# Patient Record
Sex: MALE | Race: WHITE | NOT HISPANIC OR LATINO | Employment: FULL TIME | URBAN - METROPOLITAN AREA
[De-identification: names, ages, dates, MRNs, and addresses within clinical notes are randomized per-mention and may not be internally consistent; named-entity substitution may affect disease eponyms.]

---

## 2023-04-21 DIAGNOSIS — Z00.6 EXAMINATION FOR NORMAL COMPARISON OR CONTROL IN CLINICAL RESEARCH: Primary | ICD-10-CM

## 2023-04-24 PROBLEM — R59.0 HILAR ADENOPATHY: Status: ACTIVE | Noted: 2023-04-24

## 2023-04-24 NOTE — PROGRESS NOTES
Rajiv Jurado is a 57 y.o. male here for evaluation of   Chief Complaint   Patient presents with   • Lung Nodule     NP       Problem list:  1. Hilar and mediastinal adenopathy  2. Asthma  3. GERD  4. Osteoarthritis  5. Migraine headaches  6. Status post cholecystectomy  7. Smokeless tobacco. Snuff  8. Allergy Asprin throat and face swell, soa    History of Present Illness    57-year-old gentleman, with hypertension, asthma, GERD, osteoarthritis presenting to his primary care physician on April 17, 2023 with shortness of air on exertion and fatigue.  Resting room air saturation was 97%.  He has a history of chewing tobacco.  He underwent a CT scan of the chest that reveals extensive mediastinal and hilar adenopathy. Denies fever, night sweats, but has weight loss.  He is having a lot of fatigue and intermittent shortness of air.  He was diagnosed with asthma as a child.  He does not wheeze frequently but does occasionally have wheezing.  He has aspirin sensitivity with swelling of his throat, face and eyes and severe shortness of air.  He gets a similar reaction to ibuprofen.  He does not have a history of nasal polyps.  He mostly outgrew his asthma and does not carry an inhaler.      Review of Systems   Constitutional: Positive for activity change, appetite change and fatigue.   HENT: Positive for congestion and postnasal drip.    Eyes: Positive for itching and visual disturbance.   Respiratory: Positive for cough, choking, chest tightness and shortness of breath.    Gastrointestinal: Positive for abdominal pain, constipation and nausea.   Endocrine: Positive for polyphagia.   Musculoskeletal: Positive for arthralgias, back pain, joint swelling and myalgias.   Neurological: Positive for weakness and light-headedness.   Psychiatric/Behavioral: Positive for confusion and sleep disturbance. The patient is nervous/anxious.          Current Outpatient Medications:   •  lisinopril (PRINIVIL,ZESTRIL) 40 MG tablet, Take  "1 tablet by mouth Daily., Disp: , Rfl:     Past Medical History:   Diagnosis Date   • Arthritis    • Asthma    • Asthma, extrinsic    • Bronchitis    • Diverticulosis    • GERD (gastroesophageal reflux disease)    • Headache    • Hypertension    • Osteoarthritis      Past Surgical History:   Procedure Laterality Date   • CHOLECYSTECTOMY       Social History     Socioeconomic History   • Marital status:    • Number of children: 3   Tobacco Use   • Smoking status: Never   • Smokeless tobacco: Current     Types: Snuff   Vaping Use   • Vaping Use: Never used   Substance and Sexual Activity   • Alcohol use: Never   • Drug use: Never     Family History   Problem Relation Age of Onset   • Cancer Mother 55   • Lung cancer Mother    • Heart disease Father 72   • Lung disease Father    • Cancer Sister 46   • Diabetes type II Sister      Blood pressure 122/84, pulse 57, temperature 97.1 °F (36.2 °C), temperature source Temporal, height 182.9 cm (72\"), weight 94 kg (207 lb 3.2 oz), SpO2 95 %.    Physical Exam  Constitutional:       General: He is not in acute distress.     Appearance: He is normal weight.   HENT:      Head: Normocephalic and atraumatic.      Nose: No congestion.      Mouth/Throat:      Mouth: Mucous membranes are moist.      Pharynx: Oropharynx is clear.      Comments: PND  Eyes:      Conjunctiva/sclera: Conjunctivae normal.   Cardiovascular:      Rate and Rhythm: Normal rate and regular rhythm.      Heart sounds: No murmur heard.  Pulmonary:      Effort: Pulmonary effort is normal.      Breath sounds: No wheezing or rhonchi.   Abdominal:      General: Bowel sounds are normal.      Palpations: Abdomen is soft.   Musculoskeletal:         General: No deformity.      Right lower leg: No edema.      Left lower leg: No edema.   Lymphadenopathy:      Cervical: No cervical adenopathy.   Skin:     General: Skin is warm and dry.   Neurological:      Mental Status: He is alert and oriented to person, place, and " time.   Psychiatric:         Mood and Affect: Mood normal.           PFTs:    Radiology:    CT scan of the chest April 17, 2023, extensive paratracheal, AP window, subcarinal and bilateral hilar adenopathy    Lab:    Diagnoses and all orders for this visit:    1. Hilar adenopathy (Primary)        Discussion:     57-year-old gentleman with hilar and subcarinal adenopathy, fatigue and shortness of air.  He does not have any palpable cervical adenopathy.  He does not smoke cigarettes but he does dip.  This could represent sarcoidosis, lymphoma, less likely metastatic cancer.  He has no previous history of a neoplasm.  He was having left lower quadrant pain and is scheduled for colonoscopy on May 10.    Schedule EBUS bronchoscopy with Dr. Woody Cohen  CBC with differential, BMP, pro time, fungal serologies  Follow-up in 2 weeks    Kat Rosado MD

## 2023-04-25 ENCOUNTER — PATIENT OUTREACH (OUTPATIENT)
Dept: ONCOLOGY | Facility: CLINIC | Age: 58
End: 2023-04-25
Payer: COMMERCIAL

## 2023-04-25 ENCOUNTER — OFFICE VISIT (OUTPATIENT)
Dept: PULMONOLOGY | Facility: CLINIC | Age: 58
End: 2023-04-25
Payer: COMMERCIAL

## 2023-04-25 VITALS
SYSTOLIC BLOOD PRESSURE: 122 MMHG | BODY MASS INDEX: 28.06 KG/M2 | OXYGEN SATURATION: 95 % | HEART RATE: 57 BPM | TEMPERATURE: 97.1 F | DIASTOLIC BLOOD PRESSURE: 84 MMHG | HEIGHT: 72 IN | WEIGHT: 207.2 LBS

## 2023-04-25 DIAGNOSIS — R59.9 ADENOPATHY: Primary | ICD-10-CM

## 2023-04-25 DIAGNOSIS — R06.02 SOB (SHORTNESS OF BREATH): ICD-10-CM

## 2023-04-25 DIAGNOSIS — R59.0 HILAR ADENOPATHY: Primary | ICD-10-CM

## 2023-04-25 LAB
BASOPHILS # BLD AUTO: 0.08 10*3/MM3 (ref 0–0.2)
BASOPHILS NFR BLD AUTO: 1.4 % (ref 0–1.5)
DEPRECATED RDW RBC AUTO: 42.6 FL (ref 37–54)
EOSINOPHIL # BLD AUTO: 0.26 10*3/MM3 (ref 0–0.4)
EOSINOPHIL NFR BLD AUTO: 4.5 % (ref 0.3–6.2)
ERYTHROCYTE [DISTWIDTH] IN BLOOD BY AUTOMATED COUNT: 13.5 % (ref 12.3–15.4)
HCT VFR BLD AUTO: 44.3 % (ref 37.5–51)
HGB BLD-MCNC: 15.4 G/DL (ref 13–17.7)
IMM GRANULOCYTES # BLD AUTO: 0.02 10*3/MM3 (ref 0–0.05)
IMM GRANULOCYTES NFR BLD AUTO: 0.3 % (ref 0–0.5)
INR PPP: 1.05 (ref 0.84–1.13)
LYMPHOCYTES # BLD AUTO: 0.97 10*3/MM3 (ref 0.7–3.1)
LYMPHOCYTES NFR BLD AUTO: 16.8 % (ref 19.6–45.3)
MCH RBC QN AUTO: 30.2 PG (ref 26.6–33)
MCHC RBC AUTO-ENTMCNC: 34.8 G/DL (ref 31.5–35.7)
MCV RBC AUTO: 86.9 FL (ref 79–97)
MONOCYTES # BLD AUTO: 0.57 10*3/MM3 (ref 0.1–0.9)
MONOCYTES NFR BLD AUTO: 9.9 % (ref 5–12)
NEUTROPHILS NFR BLD AUTO: 3.88 10*3/MM3 (ref 1.7–7)
NEUTROPHILS NFR BLD AUTO: 67.1 % (ref 42.7–76)
NRBC BLD AUTO-RTO: 0 /100 WBC (ref 0–0.2)
PLATELET # BLD AUTO: 179 10*3/MM3 (ref 140–450)
PMV BLD AUTO: 11.5 FL (ref 6–12)
PROTHROMBIN TIME: 13.6 SECONDS (ref 11.4–14.4)
RBC # BLD AUTO: 5.1 10*6/MM3 (ref 4.14–5.8)
WBC NRBC COR # BLD: 5.78 10*3/MM3 (ref 3.4–10.8)

## 2023-04-25 PROCEDURE — 86606 ASPERGILLUS ANTIBODY: CPT | Performed by: INTERNAL MEDICINE

## 2023-04-25 PROCEDURE — 86612 BLASTOMYCES ANTIBODY: CPT | Performed by: INTERNAL MEDICINE

## 2023-04-25 PROCEDURE — 85025 COMPLETE CBC W/AUTO DIFF WBC: CPT | Performed by: INTERNAL MEDICINE

## 2023-04-25 PROCEDURE — 85610 PROTHROMBIN TIME: CPT | Performed by: INTERNAL MEDICINE

## 2023-04-25 PROCEDURE — 80048 BASIC METABOLIC PNL TOTAL CA: CPT | Performed by: INTERNAL MEDICINE

## 2023-04-25 RX ORDER — LISINOPRIL 40 MG/1
40 TABLET ORAL DAILY
COMMUNITY

## 2023-04-26 ENCOUNTER — ANESTHESIA EVENT (OUTPATIENT)
Dept: GASTROENTEROLOGY | Facility: HOSPITAL | Age: 58
End: 2023-04-26
Payer: COMMERCIAL

## 2023-04-26 ENCOUNTER — PREP FOR SURGERY (OUTPATIENT)
Dept: OTHER | Facility: HOSPITAL | Age: 58
End: 2023-04-26
Payer: COMMERCIAL

## 2023-04-26 DIAGNOSIS — R91.1 PULMONARY NODULE: Primary | ICD-10-CM

## 2023-04-26 LAB
ANION GAP SERPL CALCULATED.3IONS-SCNC: 10.4 MMOL/L (ref 5–15)
BUN SERPL-MCNC: 14 MG/DL (ref 6–20)
BUN/CREAT SERPL: 10.1 (ref 7–25)
CALCIUM SPEC-SCNC: 10.1 MG/DL (ref 8.6–10.5)
CHLORIDE SERPL-SCNC: 105 MMOL/L (ref 98–107)
CO2 SERPL-SCNC: 24.6 MMOL/L (ref 22–29)
CREAT SERPL-MCNC: 1.39 MG/DL (ref 0.76–1.27)
EGFRCR SERPLBLD CKD-EPI 2021: 59.5 ML/MIN/1.73
GLUCOSE SERPL-MCNC: 75 MG/DL (ref 65–99)
POTASSIUM SERPL-SCNC: 4.6 MMOL/L (ref 3.5–5.2)
SODIUM SERPL-SCNC: 140 MMOL/L (ref 136–145)

## 2023-04-26 RX ORDER — LIDOCAINE HYDROCHLORIDE 40 MG/ML
4 INJECTION, SOLUTION RETROBULBAR; TOPICAL ONCE
Status: CANCELLED | OUTPATIENT
Start: 2023-04-26 | End: 2023-04-26

## 2023-04-26 RX ORDER — SODIUM CHLORIDE 0.9 % (FLUSH) 0.9 %
10 SYRINGE (ML) INJECTION EVERY 12 HOURS SCHEDULED
Status: CANCELLED | OUTPATIENT
Start: 2023-04-26

## 2023-04-26 RX ORDER — LIDOCAINE HYDROCHLORIDE 10 MG/ML
0.5 INJECTION, SOLUTION EPIDURAL; INFILTRATION; INTRACAUDAL; PERINEURAL ONCE AS NEEDED
Status: CANCELLED | OUTPATIENT
Start: 2023-04-26

## 2023-04-26 RX ORDER — HYDROMORPHONE HYDROCHLORIDE 1 MG/ML
0.5 INJECTION, SOLUTION INTRAMUSCULAR; INTRAVENOUS; SUBCUTANEOUS
Status: CANCELLED | OUTPATIENT
Start: 2023-04-26

## 2023-04-26 RX ORDER — SODIUM CHLORIDE 9 MG/ML
125 INJECTION, SOLUTION INTRAVENOUS CONTINUOUS
Status: CANCELLED | OUTPATIENT
Start: 2023-04-26

## 2023-04-26 RX ORDER — SODIUM CHLORIDE 9 MG/ML
40 INJECTION, SOLUTION INTRAVENOUS AS NEEDED
Status: CANCELLED | OUTPATIENT
Start: 2023-04-26

## 2023-04-26 RX ORDER — SODIUM CHLORIDE 0.9 % (FLUSH) 0.9 %
10 SYRINGE (ML) INJECTION AS NEEDED
Status: CANCELLED | OUTPATIENT
Start: 2023-04-26

## 2023-04-26 RX ORDER — SODIUM CHLORIDE, SODIUM LACTATE, POTASSIUM CHLORIDE, CALCIUM CHLORIDE 600; 310; 30; 20 MG/100ML; MG/100ML; MG/100ML; MG/100ML
9 INJECTION, SOLUTION INTRAVENOUS CONTINUOUS
Status: CANCELLED | OUTPATIENT
Start: 2023-04-26

## 2023-04-26 RX ORDER — SODIUM CHLORIDE 0.9 % (FLUSH) 0.9 %
3 SYRINGE (ML) INJECTION EVERY 12 HOURS SCHEDULED
Status: CANCELLED | OUTPATIENT
Start: 2023-04-26

## 2023-04-26 RX ORDER — FAMOTIDINE 20 MG/1
20 TABLET, FILM COATED ORAL ONCE
Status: CANCELLED | OUTPATIENT
Start: 2023-04-26 | End: 2023-04-26

## 2023-04-26 RX ORDER — FENTANYL CITRATE 50 UG/ML
50 INJECTION, SOLUTION INTRAMUSCULAR; INTRAVENOUS
Status: CANCELLED | OUTPATIENT
Start: 2023-04-26

## 2023-04-27 ENCOUNTER — ANESTHESIA (OUTPATIENT)
Dept: GASTROENTEROLOGY | Facility: HOSPITAL | Age: 58
End: 2023-04-27
Payer: COMMERCIAL

## 2023-04-27 ENCOUNTER — HOSPITAL ENCOUNTER (OUTPATIENT)
Facility: HOSPITAL | Age: 58
Setting detail: HOSPITAL OUTPATIENT SURGERY
Discharge: HOME | End: 2023-04-27
Attending: INTERNAL MEDICINE | Admitting: INTERNAL MEDICINE
Payer: COMMERCIAL

## 2023-04-27 VITALS
HEART RATE: 68 BPM | SYSTOLIC BLOOD PRESSURE: 119 MMHG | DIASTOLIC BLOOD PRESSURE: 77 MMHG | RESPIRATION RATE: 18 BRPM | OXYGEN SATURATION: 96 % | TEMPERATURE: 97 F

## 2023-04-27 DIAGNOSIS — R91.1 PULMONARY NODULE: ICD-10-CM

## 2023-04-27 PROBLEM — R59.0 MEDIASTINAL ADENOPATHY: Status: ACTIVE | Noted: 2023-04-27

## 2023-04-27 PROCEDURE — 25010000002 FENTANYL CITRATE (PF) 100 MCG/2ML SOLUTION: Performed by: NURSE ANESTHETIST, CERTIFIED REGISTERED

## 2023-04-27 PROCEDURE — 31653 BRONCH EBUS SAMPLNG 3/> NODE: CPT | Performed by: INTERNAL MEDICINE

## 2023-04-27 PROCEDURE — 25010000002 ONDANSETRON PER 1 MG: Performed by: NURSE ANESTHETIST, CERTIFIED REGISTERED

## 2023-04-27 PROCEDURE — 87116 MYCOBACTERIA CULTURE: CPT | Performed by: INTERNAL MEDICINE

## 2023-04-27 PROCEDURE — 31625 BRONCHOSCOPY W/BIOPSY(S): CPT | Performed by: INTERNAL MEDICINE

## 2023-04-27 PROCEDURE — 25010000002 SUCCINYLCHOLINE PER 20 MG: Performed by: NURSE ANESTHETIST, CERTIFIED REGISTERED

## 2023-04-27 PROCEDURE — 87206 SMEAR FLUORESCENT/ACID STAI: CPT | Performed by: INTERNAL MEDICINE

## 2023-04-27 PROCEDURE — 87070 CULTURE OTHR SPECIMN AEROBIC: CPT | Performed by: INTERNAL MEDICINE

## 2023-04-27 PROCEDURE — 87205 SMEAR GRAM STAIN: CPT | Performed by: INTERNAL MEDICINE

## 2023-04-27 PROCEDURE — 87102 FUNGUS ISOLATION CULTURE: CPT | Performed by: INTERNAL MEDICINE

## 2023-04-27 PROCEDURE — 88313 SPECIAL STAINS GROUP 2: CPT | Performed by: INTERNAL MEDICINE

## 2023-04-27 PROCEDURE — 88312 SPECIAL STAINS GROUP 1: CPT | Performed by: INTERNAL MEDICINE

## 2023-04-27 PROCEDURE — 25010000002 PROCHLORPERAZINE 10 MG/2ML SOLUTION: Performed by: ANESTHESIOLOGY

## 2023-04-27 PROCEDURE — C1726 CATH, BAL DIL, NON-VASCULAR: HCPCS | Performed by: INTERNAL MEDICINE

## 2023-04-27 PROCEDURE — 88305 TISSUE EXAM BY PATHOLOGIST: CPT | Performed by: INTERNAL MEDICINE

## 2023-04-27 PROCEDURE — 25010000002 PROPOFOL 10 MG/ML EMULSION: Performed by: NURSE ANESTHETIST, CERTIFIED REGISTERED

## 2023-04-27 RX ORDER — SODIUM CHLORIDE 9 MG/ML
125 INJECTION, SOLUTION INTRAVENOUS CONTINUOUS
Status: DISCONTINUED | OUTPATIENT
Start: 2023-04-27 | End: 2023-04-27 | Stop reason: HOSPADM

## 2023-04-27 RX ORDER — FENTANYL CITRATE 50 UG/ML
INJECTION, SOLUTION INTRAMUSCULAR; INTRAVENOUS AS NEEDED
Status: DISCONTINUED | OUTPATIENT
Start: 2023-04-27 | End: 2023-04-27 | Stop reason: SURG

## 2023-04-27 RX ORDER — LIDOCAINE HYDROCHLORIDE 40 MG/ML
4 INJECTION, SOLUTION RETROBULBAR; TOPICAL ONCE
Status: DISCONTINUED | OUTPATIENT
Start: 2023-04-27 | End: 2023-04-27 | Stop reason: HOSPADM

## 2023-04-27 RX ORDER — ONDANSETRON 2 MG/ML
INJECTION INTRAMUSCULAR; INTRAVENOUS AS NEEDED
Status: DISCONTINUED | OUTPATIENT
Start: 2023-04-27 | End: 2023-04-27 | Stop reason: SURG

## 2023-04-27 RX ORDER — ROCURONIUM BROMIDE 10 MG/ML
INJECTION, SOLUTION INTRAVENOUS AS NEEDED
Status: DISCONTINUED | OUTPATIENT
Start: 2023-04-27 | End: 2023-04-27 | Stop reason: SURG

## 2023-04-27 RX ORDER — PROCHLORPERAZINE EDISYLATE 5 MG/ML
5 INJECTION INTRAMUSCULAR; INTRAVENOUS ONCE
Status: COMPLETED | OUTPATIENT
Start: 2023-04-27 | End: 2023-04-27

## 2023-04-27 RX ORDER — EPHEDRINE SULFATE 50 MG/ML
INJECTION INTRAVENOUS AS NEEDED
Status: DISCONTINUED | OUTPATIENT
Start: 2023-04-27 | End: 2023-04-27 | Stop reason: SURG

## 2023-04-27 RX ORDER — SUCCINYLCHOLINE CHLORIDE 20 MG/ML
INJECTION INTRAMUSCULAR; INTRAVENOUS AS NEEDED
Status: DISCONTINUED | OUTPATIENT
Start: 2023-04-27 | End: 2023-04-27 | Stop reason: SURG

## 2023-04-27 RX ORDER — MIDAZOLAM HYDROCHLORIDE 1 MG/ML
1 INJECTION INTRAMUSCULAR; INTRAVENOUS
Status: DISCONTINUED | OUTPATIENT
Start: 2023-04-27 | End: 2023-04-27 | Stop reason: HOSPADM

## 2023-04-27 RX ORDER — SODIUM CHLORIDE 0.9 % (FLUSH) 0.9 %
10 SYRINGE (ML) INJECTION AS NEEDED
Status: DISCONTINUED | OUTPATIENT
Start: 2023-04-27 | End: 2023-04-27 | Stop reason: HOSPADM

## 2023-04-27 RX ORDER — PROPOFOL 10 MG/ML
VIAL (ML) INTRAVENOUS AS NEEDED
Status: DISCONTINUED | OUTPATIENT
Start: 2023-04-27 | End: 2023-04-27 | Stop reason: SURG

## 2023-04-27 RX ORDER — SODIUM CHLORIDE 9 MG/ML
40 INJECTION, SOLUTION INTRAVENOUS AS NEEDED
Status: DISCONTINUED | OUTPATIENT
Start: 2023-04-27 | End: 2023-04-27 | Stop reason: HOSPADM

## 2023-04-27 RX ORDER — FAMOTIDINE 10 MG/ML
20 INJECTION, SOLUTION INTRAVENOUS ONCE
Status: COMPLETED | OUTPATIENT
Start: 2023-04-27 | End: 2023-04-27

## 2023-04-27 RX ORDER — LIDOCAINE HYDROCHLORIDE 10 MG/ML
INJECTION, SOLUTION EPIDURAL; INFILTRATION; INTRACAUDAL; PERINEURAL AS NEEDED
Status: DISCONTINUED | OUTPATIENT
Start: 2023-04-27 | End: 2023-04-27 | Stop reason: SURG

## 2023-04-27 RX ORDER — SODIUM CHLORIDE 0.9 % (FLUSH) 0.9 %
3 SYRINGE (ML) INJECTION EVERY 12 HOURS SCHEDULED
Status: DISCONTINUED | OUTPATIENT
Start: 2023-04-27 | End: 2023-04-27 | Stop reason: HOSPADM

## 2023-04-27 RX ADMIN — FAMOTIDINE 20 MG: 10 INJECTION INTRAVENOUS at 09:38

## 2023-04-27 RX ADMIN — ROCURONIUM BROMIDE 30 MG: 10 INJECTION, SOLUTION INTRAVENOUS at 10:07

## 2023-04-27 RX ADMIN — Medication 10 ML: at 09:37

## 2023-04-27 RX ADMIN — FENTANYL CITRATE 100 MCG: 50 INJECTION, SOLUTION INTRAMUSCULAR; INTRAVENOUS at 10:03

## 2023-04-27 RX ADMIN — PROPOFOL 200 MG: 10 INJECTION, EMULSION INTRAVENOUS at 10:03

## 2023-04-27 RX ADMIN — ONDANSETRON 4 MG: 2 INJECTION INTRAMUSCULAR; INTRAVENOUS at 10:22

## 2023-04-27 RX ADMIN — SODIUM CHLORIDE 9 ML/HR: 9 INJECTION, SOLUTION INTRAVENOUS at 10:10

## 2023-04-27 RX ADMIN — LIDOCAINE HYDROCHLORIDE 50 MG: 10 INJECTION, SOLUTION EPIDURAL; INFILTRATION; INTRACAUDAL; PERINEURAL at 10:03

## 2023-04-27 RX ADMIN — PROCHLORPERAZINE EDISYLATE 5 MG: 5 INJECTION INTRAMUSCULAR; INTRAVENOUS at 12:08

## 2023-04-27 RX ADMIN — EPHEDRINE SULFATE 10 MG: 50 INJECTION INTRAVENOUS at 10:54

## 2023-04-27 RX ADMIN — SODIUM CHLORIDE 125 ML/HR: 9 INJECTION, SOLUTION INTRAVENOUS at 09:37

## 2023-04-27 RX ADMIN — SUCCINYLCHOLINE CHLORIDE 188 MG: 20 INJECTION, SOLUTION INTRAMUSCULAR; INTRAVENOUS at 10:03

## 2023-04-27 RX ADMIN — ROCURONIUM BROMIDE 2 MG: 10 INJECTION, SOLUTION INTRAVENOUS at 10:03

## 2023-04-27 NOTE — H&P
Kat Rosado MD   Physician  Specialty:  Pulmonary Disease  Progress Notes      Signed  Encounter Date:  4/25/2023   Related encounter: Office Visit from 4/25/2023 in [facility] with Kat Rosado MD     Signed        Expand All Collapse All    Rajiv Jurado is a 57 y.o. male here for evaluation of        Chief Complaint   Patient presents with   • Lung Nodule       NP         Problem list:  1. Hilar and mediastinal adenopathy  2. Asthma  3. GERD  4. Osteoarthritis  5. Migraine headaches  6. Status post cholecystectomy  7. Smokeless tobacco. Snuff  8. Allergy Asprin throat and face swell, soa     History of Present Illness     57-year-old gentleman, with hypertension, asthma, GERD, osteoarthritis presenting to his primary care physician on April 17, 2023 with shortness of air on exertion and fatigue.  Resting room air saturation was 97%.  He has a history of chewing tobacco.  He underwent a CT scan of the chest that reveals extensive mediastinal and hilar adenopathy. Denies fever, night sweats, but has weight loss.  He is having a lot of fatigue and intermittent shortness of air.  He was diagnosed with asthma as a child.  He does not wheeze frequently but does occasionally have wheezing.  He has aspirin sensitivity with swelling of his throat, face and eyes and severe shortness of air.  He gets a similar reaction to ibuprofen.  He does not have a history of nasal polyps.  He mostly outgrew his asthma and does not carry an inhaler.        Review of Systems   Constitutional: Positive for activity change, appetite change and fatigue.   HENT: Positive for congestion and postnasal drip.    Eyes: Positive for itching and visual disturbance.   Respiratory: Positive for cough, choking, chest tightness and shortness of breath.    Gastrointestinal: Positive for abdominal pain, constipation and nausea.   Endocrine: Positive for polyphagia.  "  Musculoskeletal: Positive for arthralgias, back pain, joint swelling and myalgias.   Neurological: Positive for weakness and light-headedness.   Psychiatric/Behavioral: Positive for confusion and sleep disturbance. The patient is nervous/anxious.             Current Outpatient Medications:   •  lisinopril (PRINIVIL,ZESTRIL) 40 MG tablet, Take 1 tablet by mouth Daily., Disp: , Rfl:      Medical History        Past Medical History:   Diagnosis Date   • Arthritis     • Asthma     • Asthma, extrinsic     • Bronchitis     • Diverticulosis     • GERD (gastroesophageal reflux disease)     • Headache     • Hypertension     • Osteoarthritis           Surgical History         Past Surgical History:   Procedure Laterality Date   • CHOLECYSTECTOMY             Social History   Social History            Socioeconomic History   • Marital status:    • Number of children: 3   Tobacco Use   • Smoking status: Never   • Smokeless tobacco: Current       Types: Snuff   Vaping Use   • Vaping Use: Never used   Substance and Sexual Activity   • Alcohol use: Never   • Drug use: Never               Family History   Problem Relation Age of Onset   • Cancer Mother 55   • Lung cancer Mother     • Heart disease Father 72   • Lung disease Father     • Cancer Sister 46   • Diabetes type II Sister        Blood pressure 122/84, pulse 57, temperature 97.1 °F (36.2 °C), temperature source Temporal, height 182.9 cm (72\"), weight 94 kg (207 lb 3.2 oz), SpO2 95 %.     Physical Exam  Constitutional:       General: He is not in acute distress.     Appearance: He is normal weight.   HENT:      Head: Normocephalic and atraumatic.      Nose: No congestion.      Mouth/Throat:      Mouth: Mucous membranes are moist.      Pharynx: Oropharynx is clear.      Comments: PND  Eyes:      Conjunctiva/sclera: Conjunctivae normal.   Cardiovascular:      Rate and Rhythm: Normal rate and regular rhythm.      Heart sounds: No murmur heard.  Pulmonary:      Effort: " Pulmonary effort is normal.      Breath sounds: No wheezing or rhonchi.   Abdominal:      General: Bowel sounds are normal.      Palpations: Abdomen is soft.   Musculoskeletal:         General: No deformity.      Right lower leg: No edema.      Left lower leg: No edema.   Lymphadenopathy:      Cervical: No cervical adenopathy.   Skin:     General: Skin is warm and dry.   Neurological:      Mental Status: He is alert and oriented to person, place, and time.   Psychiatric:         Mood and Affect: Mood normal.               PFTs:     Radiology:     CT scan of the chest April 17, 2023, extensive paratracheal, AP window, subcarinal and bilateral hilar adenopathy     Lab:     Diagnoses and all orders for this visit:     1. Hilar adenopathy (Primary)           Discussion:      57-year-old gentleman with hilar and subcarinal adenopathy, fatigue and shortness of air.  He does not have any palpable cervical adenopathy.  He does not smoke cigarettes but he does dip.  This could represent sarcoidosis, lymphoma, less likely metastatic cancer.  He has no previous history of a neoplasm.  He was having left lower quadrant pain and is scheduled for colonoscopy on May 10.     Schedule EBUS bronchoscopy with Dr. Woody Cohen  CBC with differential, BMP, pro time, fungal serologies  Follow-up in 2 weeks     Kat Rosado MD                       Above note was reviewed with patient.  No changes from prior.  Discussed risks, benefits and alternatives with patient, including the risk of bleeding, pneumothorax, medication reaction, respiratory failure, and death, and he is agreeable to proceed.     Electronically signed by:Hamzah Hernandez MD 04/27/23 10:04 EDT

## 2023-04-27 NOTE — OP NOTE
Bronchoscopy Procedural Note       Date of Operation: 4/27/2023    Indication: This is a 57 y.o. with new onset of mediastinal adenopathy of undetermined etiology.  Bronchoscopy for diagnosis.    Pre-op Diagnosis: Mediastinal adenopathy    Post-op Diagnosis: Mediastinal adenopathy status post EBUS directed TBNA and endobronchial biopsy.  Chronically inflamed appearing bronchial mucosa.    Surgeon: Hamzah Hernandez MD      Referring Physician: Kat Rosado MD.    Procedures Performed:  Flexible videobronchoscopy  Bronchial washings  Endobronchial ultrasound-guided TBNA of stations 7, 10 R, 11 R, 11 L.  Endobronchial biopsy distal left mainstem bronchus.    Anesthesia: General endotracheal, per anesthesia    Estimated Blood Loss: <5 ml    Description of Procedure:    Informed consent was obtained after the risks and benefits of the procedure, including the risk of bleeding, pneumothorax, medication reaction, and death were described.  A signed informed consent form was placed on the chart prior to the procedure.      Patient was brought to the endoscopy room where he was intubated by anesthesia who monitored the patient throughout the case.    A flexible video bronchoscope was inserted through the existing endotracheal tube and advanced to the distal trachea.  The tracheal mucosa showed moderate chronic inflammatory change.  The main aylin was splayed.  The right bronchial tree was examined to at least the segmental level and showed normal bronchial anatomy with chronically inflamed appearing mucosa no discrete endobronchial lesions.  The left bronchial tree was examined to at least the segmental level and showed normal bronchial anatomy with moderate to severely inflamed bronchial mucosa and no discrete endobronchial lesion seen.  A flexible video bronchoscope was then removed.    A linear endobronchial ultrasound scope was inserted to the endotracheal tube and advanced to the mediastinum.  The  mediastinal and hilar lymph node stations were evaluated with endobronchial ultrasound.  There were enlarged lymph nodes at stations 7, 10 R, 11 R, and 11 L.  I then proceeded to use a 21-gauge TBNA needle and real-time endobronchial ultrasound guidance to perform multiple passes of the TBNA needle at stations 7, followed by 10 R, followed by 11 R, followed by 11 L.  There was adequate specimen obtained and no significant bleeding.  I then removed the endobronchial ultrasound scope.    I reinserted the flexible video bronchoscope and advanced it to the distal trachea.  I did an airway inspection and cleaned up a few small blood clots.  I then did an endobronchial biopsy of inflamed appearing mucosa at the distal left mainstem bronchus x3.  There was some mild oozing of blood which was self-limited.  I cleaned up any residual secretions and then removed the scope and turned the patient over to anesthesia and endoscopy staff for extubation and postprocedure monitoring.    There were no immediate complications.    Findings and Recommendations:  Specimens obtained:  1.  Station 7 TBNA.  2.  Station 10 R TBNA.  3.  Station 11 R TBNA.  4.  Station 11 L TBNA.  5.  Endobronchial biopsy left mainstem bronchus.  6.  Bronchial washings.    Patient will follow-up with Dr. Rosado the week after next for preliminary results of biopsies.    Electronically signed by:  Hamzah Hernandez MD  Pulmonary and Critical Care Medicine   04/27/23 11:11 EDT

## 2023-04-27 NOTE — ANESTHESIA POSTPROCEDURE EVALUATION
Patient: Rajiv Jurado    Procedure Summary     Date: 04/27/23 Room / Location:  [facility] /  [facility]    Anesthesia Start: 0959 Anesthesia Stop: 1114    Procedure: BRONCHOSCOPY WITH ENDOBRONCHIAL ULTRASOUND (Bronchus) Diagnosis:       Pulmonary nodule      (Pulmonary nodule [R91.1])    Surgeons: Hamzah Hernandez MD Provider: Radha Maxwell DO    Anesthesia Type: general ASA Status: 2          Anesthesia Type: general    Vitals  No vitals data found for the desired time range.          Post Anesthesia Care and Evaluation    Patient location during evaluation: PACU  Patient participation: complete - patient participated  Level of consciousness: awake and responsive to verbal stimuli  Pain score: 2  Pain management: adequate    Airway patency: patent  Anesthetic complications: No anesthetic complications    Cardiovascular status: acceptable  Respiratory status: acceptable  Hydration status: acceptable    Comments: Pt awake and responsive. SV. VSS. Report to RN. Patient Vitals in the past 24 hrs:  04/27/23 0925, BP:143/99, Temp:97 °F (36.1 °C), Temp src:Temporal, Pulse:50, Resp:18, SpO2:97 %  133/78. p 72. r 16. t 98.1

## 2023-04-27 NOTE — ANESTHESIA PREPROCEDURE EVALUATION
Anesthesia Evaluation     Patient summary reviewed and Nursing notes reviewed   no history of anesthetic complications:  NPO Solid Status: > 8 hours  NPO Liquid Status: > 2 hours           Airway   Mallampati: II  TM distance: >3 FB  Neck ROM: full  No difficulty expected  Dental      Pulmonary - normal exam   (+) asthma,  (-) not a smoker  Cardiovascular - normal exam  Exercise tolerance: good (4-7 METS)    (+) hypertension,   (-) dysrhythmias, angina, CHF, cardiac stents      Neuro/Psych  (+) headaches,    GI/Hepatic/Renal/Endo    (+)  GERD,    (-) liver disease, no renal disease, diabetes, no thyroid disorder    Musculoskeletal     Abdominal    Substance History - negative use     OB/GYN          Other   arthritis,      ROS/Med Hx Other: hgb  15.4 k, 4.6   mediastinal and hilar adenopathy                Anesthesia Plan    ASA 2     general   Rapid sequence  (Risks and benefits of general anesthesia discussed with patient (including MI, CVA, death, recall, aspiration, oropharyngeal/dental damage), questions answered, agreeable to proceed.    )  intravenous induction     Anesthetic plan, risks, benefits, and alternatives have been provided, discussed and informed consent has been obtained with: patient.    Plan discussed with CRNA.        CODE STATUS:

## 2023-04-28 LAB
CYTO UR: NORMAL
LAB AP CASE REPORT: NORMAL
LAB AP CLINICAL INFORMATION: NORMAL
LAB AP DIAGNOSIS COMMENT: NORMAL
LAB AP FLOW CYTOMETRY SUMMARY: NORMAL
NIGHT BLUE STAIN TISS: NORMAL
PATH REPORT.FINAL DX SPEC: NORMAL
PATH REPORT.GROSS SPEC: NORMAL
REF LAB TEST METHOD: NORMAL

## 2023-04-29 LAB
A FLAVUS AB SER QL ID: NEGATIVE
A FUMIGATUS AB SER QL ID: NEGATIVE
A NIGER AB SER QL ID: NEGATIVE
B DERMAT AB TITR SER: NEGATIVE {TITER}
BACTERIA SPEC RESP CULT: NORMAL
GRAM STN SPEC: NORMAL

## 2023-04-30 LAB
GIE STN SPEC: NORMAL
GIE STN SPEC: NORMAL

## 2023-05-02 ENCOUNTER — DOCUMENTATION (OUTPATIENT)
Dept: PULMONOLOGY | Facility: CLINIC | Age: 58
End: 2023-05-02
Payer: COMMERCIAL

## 2023-05-02 NOTE — PROGRESS NOTES
Patient did undergo EBUS April 27, 2023.    Station 7 lymph node revealed epithelioid granuloma, station 10 R was an inadequate specimen, station 11 R revealed noncaseating granulomas, flow cytometry was negative for lymphoma or clonal B-cell population.  Bronchial washing revealed benign epithelial cells and macrophages with some inflammatory cells but no neoplasm.  AFB smear is negative, fungal smears negative and routine cultures are negative.  He does not have any evidence of interstitial lung disease on his imaging.  This could be reactive adenopathy or sarcoidosis.  However, would not treat the adenopathy unless there is interstitial disease.  It does need to be monitored over time as 75% of sarcoid patients will spontaneously remit within several years.  Others will go on to develop interstitial lung disease.  I am not sure that this adenopathy is causing his exertional dyspnea.  His CBC showed a normal hemoglobin of 15.4 and a normal white count of 5.7 but 4.5% eosinophils with an absolute eosinophil count of 260.  Fungal serologies were negative.  Serum creatinine is mildly elevated at 1.39 and serum calcium is normal at 10.1.  At follow-up it might be useful to do a CRP and a serum ACE level.  In addition he has a history of childhood asthma but did not have pulmonary function test on his initial visit.  These would be helpful to determine whether he has any reactive airways disease.  Perhaps his shortness of air will improve with inhalers.  In addition he would also benefit from cardiac evaluation for potential etiologies.  With fatigue, sleep apnea is also a concern.

## 2023-05-04 LAB
FUNGUS WND CULT: NORMAL
MYCOBACTERIUM SPEC CULT: NORMAL
NIGHT BLUE STAIN TISS: NORMAL

## 2023-05-09 ENCOUNTER — OFFICE VISIT (OUTPATIENT)
Dept: PULMONOLOGY | Facility: CLINIC | Age: 58
End: 2023-05-09
Payer: COMMERCIAL

## 2023-05-09 VITALS
WEIGHT: 210.4 LBS | DIASTOLIC BLOOD PRESSURE: 92 MMHG | HEART RATE: 56 BPM | OXYGEN SATURATION: 95 % | BODY MASS INDEX: 28.5 KG/M2 | SYSTOLIC BLOOD PRESSURE: 142 MMHG | HEIGHT: 72 IN | TEMPERATURE: 97.1 F

## 2023-05-09 DIAGNOSIS — R59.0 HILAR ADENOPATHY: Primary | ICD-10-CM

## 2023-05-09 DIAGNOSIS — R91.1 PULMONARY NODULE: ICD-10-CM

## 2023-05-09 DIAGNOSIS — R59.0 MEDIASTINAL ADENOPATHY: ICD-10-CM

## 2023-05-09 RX ORDER — BUDESONIDE AND FORMOTEROL FUMARATE DIHYDRATE 80; 4.5 UG/1; UG/1
AEROSOL RESPIRATORY (INHALATION)
COMMUNITY
Start: 2023-05-03

## 2023-05-09 RX ORDER — ALBUTEROL SULFATE 90 UG/1
2 AEROSOL, METERED RESPIRATORY (INHALATION) EVERY 4 HOURS PRN
COMMUNITY

## 2023-05-09 RX ORDER — ALBUTEROL SULFATE 90 UG/1
4 AEROSOL, METERED RESPIRATORY (INHALATION) ONCE
Status: COMPLETED | OUTPATIENT
Start: 2023-05-09 | End: 2023-05-09

## 2023-05-09 RX ADMIN — ALBUTEROL SULFATE 4 PUFF: 90 AEROSOL, METERED RESPIRATORY (INHALATION) at 10:46

## 2023-05-09 NOTE — PROGRESS NOTES
[facility] Pulmonary Follow up    CHIEF COMPLAINT    Cough    HISTORY OF PRESENT ILLNESS    Rajiv Jurado is a 57 y.o.male here today for follow-up.  He was last seen in the office in April by Dr. Rosado.  He was referred to our office for hilar adenopathy.  He had a bronchoscopy performed on 4/27 and is here today to discuss the results.    He states he tolerated the procedure well.  He did not have any hemoptysis after the procedure.    He states that he rarely has a cough.  He does not produce any sputum when he does cough.  He denies any hemoptysis.  He denies any fever, chills or night sweats.    He states he does have some shortness of breath with heavy exertion or climbing inclines.  He does recover very quickly at rest.  He was given a Symbicort inhaler and wants to discuss whether he should use it or not.    He does not sleep well.  He does have loud snoring.  He has never had a sleep study.  He does feel fatigued during the day.    He currently is off work due to having the abnormal CT scan and is planning on having a colonoscopy in the near future.  He has a spot on his colon that his PCP was concerned about.    He is a lifetime non-smoker.  He will occasionally smoke a cigar.    He is accompanied today by his wife.  Patient Active Problem List   Diagnosis   • Hilar adenopathy   • Pulmonary nodule   • Mediastinal adenopathy       Allergies   Allergen Reactions   • Aspirin Rash       Current Outpatient Medications:   •  albuterol sulfate  (90 Base) MCG/ACT inhaler, Inhale 2 puffs Every 4 (Four) Hours As Needed for Wheezing., Disp: , Rfl:   •  budesonide-formoterol (SYMBICORT) 80-4.5 MCG/ACT inhaler, , Disp: , Rfl:   •  lisinopril (PRINIVIL,ZESTRIL) 40 MG tablet, Take 1 tablet by mouth Daily., Disp: , Rfl:   No current facility-administered medications for this visit.  MEDICATION LIST AND ALLERGIES REVIEWED.    Social History     Tobacco Use   • Smoking status: Never   • Smokeless tobacco:  "Current     Types: Snuff   Vaping Use   • Vaping Use: Never used   Substance Use Topics   • Alcohol use: Never   • Drug use: Never       FAMILY AND SOCIAL HISTORY REVIEWED.    Review of Systems   Constitutional: Positive for fatigue. Negative for activity change, appetite change, fever and unexpected weight change.   HENT: Negative for congestion, postnasal drip, rhinorrhea, sinus pressure, sore throat and voice change.    Eyes: Negative for visual disturbance.   Respiratory: Positive for cough and shortness of breath. Negative for chest tightness and wheezing.    Cardiovascular: Negative for chest pain, palpitations and leg swelling.   Gastrointestinal: Negative for abdominal distention, abdominal pain, nausea and vomiting.   Endocrine: Negative for cold intolerance and heat intolerance.   Genitourinary: Negative for difficulty urinating and urgency.   Musculoskeletal: Negative for arthralgias, back pain and neck pain.   Skin: Negative for color change and pallor.   Allergic/Immunologic: Negative for environmental allergies and food allergies.   Neurological: Negative for dizziness, syncope, weakness and light-headedness.   Hematological: Negative for adenopathy. Does not bruise/bleed easily.   Psychiatric/Behavioral: Negative for agitation and behavioral problems.   .    /92   Pulse 56   Temp 97.1 °F (36.2 °C)   Ht 182.9 cm (72.01\")   Wt 95.4 kg (210 lb 6.4 oz)   SpO2 95% Comment: Resting at room air  BMI 28.53 kg/m²       There is no immunization history on file for this patient.    Physical Exam  Vitals and nursing note reviewed.   Constitutional:       Appearance: He is well-developed. He is not diaphoretic.   HENT:      Head: Normocephalic and atraumatic.   Eyes:      Pupils: Pupils are equal, round, and reactive to light.   Neck:      Thyroid: No thyromegaly.   Cardiovascular:      Rate and Rhythm: Normal rate and regular rhythm.      Heart sounds: Normal heart sounds. No murmur heard.    No " friction rub. No gallop.   Pulmonary:      Effort: Pulmonary effort is normal. No respiratory distress.      Breath sounds: Normal breath sounds. No wheezing or rales.   Chest:      Chest wall: No tenderness.   Abdominal:      General: Bowel sounds are normal.      Palpations: Abdomen is soft.      Tenderness: There is no abdominal tenderness.   Musculoskeletal:         General: No swelling. Normal range of motion.      Cervical back: Normal range of motion and neck supple.   Lymphadenopathy:      Cervical: No cervical adenopathy.   Skin:     General: Skin is warm and dry.      Capillary Refill: Capillary refill takes less than 2 seconds.   Neurological:      Mental Status: He is alert and oriented to person, place, and time.   Psychiatric:         Mood and Affect: Mood normal.         Behavior: Behavior normal.           RESULTS    PFTS in the office today, read by me, FVC 3.75 72% predicted, FEV1 2.64 66% predicted, FEV1/FVC 70% predicted, TLC 7.42 103% predicted, DLCO 106% predicted, mild obstruction with no postbronchodilator response, no restriction and normal diffusion.    Chest PA/lateral: Official report pending    PROBLEM LIST    Problem List Items Addressed This Visit        Pulmonary and Pneumonias    Pulmonary nodule    Overview     Added automatically from request for surgery [id number]         Relevant Medications    budesonide-formoterol (SYMBICORT) 80-4.5 MCG/ACT inhaler    albuterol sulfate  (90 Base) MCG/ACT inhaler    albuterol sulfate HFA (PROVENTIL HFA;VENTOLIN HFA;PROAIR HFA) inhaler 4 puff (Completed)    Other Relevant Orders    CT Chest Without Contrast       Symptoms and Signs    Hilar adenopathy - Primary    Relevant Medications    budesonide-formoterol (SYMBICORT) 80-4.5 MCG/ACT inhaler    albuterol sulfate  (90 Base) MCG/ACT inhaler    albuterol sulfate HFA (PROVENTIL HFA;VENTOLIN HFA;PROAIR HFA) inhaler 4 puff (Completed)    Other Relevant Orders    XR Chest PA & Lateral    CT  Chest Without Contrast    Pulmonary Function Test (Completed)    Mediastinal adenopathy         DISCUSSION  Mr. Jurado was here for follow-up.  He had his bronchoscopy performed on 4/27 and we did review the results.  He did have some noncaseating granulomas in the lymph nodes and sarcoidosis could possibly be in the diagnosis.  His fungal cultures are negative to date.  We will continue to follow these for the entire 6 weeks.  His cytometry was negative for lymphoma or clonal B-cell population.    We did review his PFTs in the office today and he has no obstruction or restriction.  He currently is fairly asymptomatic.  He has a mild shortness of breath with exertion and slight fatigue during the day.  At this time I do think that treatment is not necessary until after his fungal cultures have been resulted.    We did discuss doing yearly eye exams and getting yearly labs.    We did discuss doing a sleep study based on his loud snoring and daytime somnolence but he declined the testing.    We will repeat a CT scan of the chest in 3 months for close follow-up.  This will be due at the end of July.    He was prescribed Symbicort and I did advise him that he could try this to see if it helps with his shortness of breath or he could use the albuterol as needed for shortness of breath.    He will follow-up at the end of July after his CT scan has been performed.  We will have the CT scan performed at [facility].    I personally spent a total of 35 minutes on patient visit today including chart review, face to face with the patient obtaining the history and physical exam, review of pertinent images and tests, counseling and discussion and/or coordination of care as described above, and documentation.  Total time excludes time spent on other separate services such as performing procedures or test interpretation, if applicable.        Nancy Blue, APRN  05/09/202313:19 EDT  Electronically signed      Please note that portions of this note were completed with a voice recognition program.        CC: Provider, No Known

## 2023-05-11 LAB
FUNGUS WND CULT: NORMAL
MYCOBACTERIUM SPEC CULT: NORMAL
NIGHT BLUE STAIN TISS: NORMAL

## 2023-05-18 LAB
FUNGUS WND CULT: NORMAL
MYCOBACTERIUM SPEC CULT: NORMAL
NIGHT BLUE STAIN TISS: NORMAL

## 2023-05-25 LAB
FUNGUS WND CULT: NORMAL
MYCOBACTERIUM SPEC CULT: NORMAL
NIGHT BLUE STAIN TISS: NORMAL

## 2023-06-01 LAB
FUNGUS WND CULT: NORMAL
MYCOBACTERIUM SPEC CULT: NORMAL
NIGHT BLUE STAIN TISS: NORMAL

## 2023-06-08 LAB
FUNGUS WND CULT: NORMAL
MYCOBACTERIUM SPEC CULT: NORMAL
NIGHT BLUE STAIN TISS: NORMAL

## 2023-08-29 ENCOUNTER — OFFICE VISIT (OUTPATIENT)
Dept: PULMONOLOGY | Facility: CLINIC | Age: 58
End: 2023-08-29
Payer: COMMERCIAL

## 2023-08-29 VITALS
OXYGEN SATURATION: 97 % | HEART RATE: 63 BPM | HEIGHT: 72 IN | SYSTOLIC BLOOD PRESSURE: 154 MMHG | DIASTOLIC BLOOD PRESSURE: 86 MMHG | BODY MASS INDEX: 28.9 KG/M2 | TEMPERATURE: 97.8 F | WEIGHT: 213.38 LBS | RESPIRATION RATE: 16 BRPM

## 2023-08-29 DIAGNOSIS — R06.02 SHORTNESS OF BREATH: ICD-10-CM

## 2023-08-29 DIAGNOSIS — R59.0 MEDIASTINAL ADENOPATHY: Primary | ICD-10-CM

## 2023-08-29 DIAGNOSIS — I10 ESSENTIAL HYPERTENSION: ICD-10-CM

## 2023-08-29 DIAGNOSIS — R59.0 HILAR ADENOPATHY: ICD-10-CM

## 2023-08-29 DIAGNOSIS — R91.1 PULMONARY NODULE: ICD-10-CM

## 2023-08-29 PROCEDURE — 99214 OFFICE O/P EST MOD 30 MIN: CPT | Performed by: NURSE PRACTITIONER

## 2023-08-29 RX ORDER — BUDESONIDE AND FORMOTEROL FUMARATE DIHYDRATE 80; 4.5 UG/1; UG/1
2 AEROSOL RESPIRATORY (INHALATION)
Qty: 10.2 G | Refills: 11 | Status: SHIPPED | OUTPATIENT
Start: 2023-08-29

## 2023-08-29 RX ORDER — ALBUTEROL SULFATE 90 UG/1
2 AEROSOL, METERED RESPIRATORY (INHALATION) EVERY 4 HOURS PRN
Qty: 18 G | Refills: 6 | Status: SHIPPED | OUTPATIENT
Start: 2023-08-29

## 2023-08-29 NOTE — PROGRESS NOTES
[facility] Pulmonary Follow up    CHIEF COMPLAINT    Fatigue/cough    HISTORY OF PRESENT ILLNESS    Rajiv Jurado is a 58 y.o.male here today for follow-up.  He was last in the office by me in May.  He was originally referred to our office for a lung nodule in April and saw Dr. Rosado.  His CT scan in April showed extensive mediastinal and hilar lymphadenopathy.  He was set up with an EBUS bronchoscopy per Dr. Hernandez and had this completed.  His fungal cultures and bacterial cultures were negative.  His station 11 R revealed noncaseating granulomas consistent with sarcoid.  He was fairly asymptomatic in the office at his last appointment and the decision was made not to treat at this time.  He had a repeat CT scan performed last week and is here today to discuss the results.    He continues to have difficulty with an occasional cough.  He also has fatigue throughout the day.  He states he typically does not produce sputum.  He occasionally coughs up clear phlegm.  He denies any hemoptysis.    He does have hypertension is on lisinopril.  He states that his blood pressure remains elevated majority of the time.    He does have daytime somnolence, nonrestorative sleep and snoring.  He has declined a sleep study.    He has Symbicort to use but has not been using it.  He did feel like it helped his cough slightly.  He has albuterol but has not used it since his last appointment.    He denies any chest pain or palpitations.  He denies any lower extremity edema or calf tenderness.    He denies reflux symptoms.    He is a non-smoker and occasionally smokes cigars and does dip.  Patient Active Problem List   Diagnosis    Hilar adenopathy    Pulmonary nodule    Mediastinal adenopathy    Shortness of breath       Allergies   Allergen Reactions    Aspirin Rash       Current Outpatient Medications:     albuterol sulfate  (90 Base) MCG/ACT inhaler, Inhale 2 puffs Every 4 (Four) Hours As Needed for Wheezing., Disp: 18  "g, Rfl: 6    budesonide-formoterol (SYMBICORT) 80-4.5 MCG/ACT inhaler, Inhale 2 puffs 2 (Two) Times a Day., Disp: 10.2 g, Rfl: 11    lisinopril (PRINIVIL,ZESTRIL) 40 MG tablet, Take 1 tablet by mouth Daily., Disp: , Rfl:   MEDICATION LIST AND ALLERGIES REVIEWED.    Social History     Tobacco Use    Smoking status: Never    Smokeless tobacco: Current     Types: Snuff   Vaping Use    Vaping Use: Never used   Substance Use Topics    Alcohol use: Never    Drug use: Never       FAMILY AND SOCIAL HISTORY REVIEWED.    Review of Systems   Constitutional:  Positive for fatigue. Negative for activity change, appetite change, fever and unexpected weight change.   HENT:  Negative for congestion, postnasal drip, rhinorrhea, sinus pressure, sore throat and voice change.    Eyes:  Negative for visual disturbance.   Respiratory:  Positive for cough and shortness of breath. Negative for chest tightness and wheezing.    Cardiovascular:  Negative for chest pain, palpitations and leg swelling.   Gastrointestinal:  Negative for abdominal distention, abdominal pain, nausea and vomiting.   Endocrine: Negative for cold intolerance and heat intolerance.   Genitourinary:  Negative for difficulty urinating and urgency.   Musculoskeletal:  Negative for arthralgias, back pain and neck pain.   Skin:  Negative for color change and pallor.   Allergic/Immunologic: Negative for environmental allergies and food allergies.   Neurological:  Negative for dizziness, syncope, weakness and light-headedness.   Hematological:  Negative for adenopathy. Does not bruise/bleed easily.   Psychiatric/Behavioral:  Negative for agitation and behavioral problems.  .    /86 (BP Location: Left arm, Patient Position: Sitting, Cuff Size: Adult)   Pulse 63   Temp 97.8 øF (36.6 øC)   Resp 16   Ht 182.9 cm (72.01\")   Wt 96.8 kg (213 lb 6 oz)   SpO2 97% Comment: room air at rest  BMI 28.93 kg/mý       There is no immunization history on file for this " patient.    Physical Exam  Vitals and nursing note reviewed.   Constitutional:       Appearance: He is well-developed. He is not diaphoretic.   HENT:      Head: Normocephalic and atraumatic.   Eyes:      Pupils: Pupils are equal, round, and reactive to light.   Neck:      Thyroid: No thyromegaly.   Cardiovascular:      Rate and Rhythm: Normal rate and regular rhythm.      Heart sounds: Normal heart sounds. No murmur heard.    No friction rub. No gallop.   Pulmonary:      Effort: Pulmonary effort is normal. No respiratory distress.      Breath sounds: Normal breath sounds. No wheezing or rales.   Chest:      Chest wall: No tenderness.   Abdominal:      General: Bowel sounds are normal.      Palpations: Abdomen is soft.      Tenderness: There is no abdominal tenderness.   Musculoskeletal:         General: No swelling. Normal range of motion.      Cervical back: Normal range of motion and neck supple.   Lymphadenopathy:      Cervical: No cervical adenopathy.   Skin:     General: Skin is warm and dry.      Capillary Refill: Capillary refill takes less than 2 seconds.   Neurological:      Mental Status: He is alert and oriented to person, place, and time.   Psychiatric:         Mood and Affect: Mood normal.         Behavior: Behavior normal.         RESULTS    CT chest on 8/24/2023: Report not in chart.    PROBLEM LIST    Problem List Items Addressed This Visit          Pulmonary and Pneumonias    Pulmonary nodule    Overview     Added automatically from request for surgery [id number]         Relevant Medications    budesonide-formoterol (SYMBICORT) 80-4.5 MCG/ACT inhaler    albuterol sulfate  (90 Base) MCG/ACT inhaler    Shortness of breath       Symptoms and Signs    Hilar adenopathy    Relevant Medications    budesonide-formoterol (SYMBICORT) 80-4.5 MCG/ACT inhaler    albuterol sulfate  (90 Base) MCG/ACT inhaler    Mediastinal adenopathy - Primary    Relevant Medications    budesonide-formoterol (SYMBICORT)  80-4.5 MCG/ACT inhaler    albuterol sulfate  (90 Base) MCG/ACT inhaler     Other Visit Diagnoses       Essential hypertension        Relevant Orders    Ambulatory Referral to Cardiology (Completed)              DISCUSSION  Mr. Jurado was here for follow-up of his abnormal CT scan.  We did review his images in the office today and the report I do not have.  I does look similar but I would like to have the report to confirm.  We will need to continue to follow his CT scan in the future.    I would like for him to start using the Symbicort at least daily to help with his cough.  I did send in refills today for him.  I also sent in albuterol rescue inhaler for him to have as needed.    I do suspect that his cough could be contributed to his blood pressure medication, he is currently on lisinopril 40 mg daily.  His blood pressure was still elevated in the office at 154/86.  I am going to refer to cardiology to further evaluate his shortness of breath.    He had normal PFTs at his last appointment.    We will have him follow-up in 3 to 4 months.    I personally spent a total of 32 minutes on patient visit today including chart review, face to face with the patient obtaining the history and physical exam, review of pertinent images and tests, counseling and discussion and/or coordination of care as described above, and documentation.  Total time excludes time spent on other separate services such as performing procedures or test interpretation, if applicable.        GIGI Sandoval  08/29/202309:28 EDT  Electronically signed     Please note that portions of this note were completed with a voice recognition program.        CC: Provider, No Known

## 2023-09-11 DIAGNOSIS — R59.0 HILAR ADENOPATHY: ICD-10-CM

## 2023-09-11 DIAGNOSIS — R91.1 PULMONARY NODULE: ICD-10-CM

## 2023-10-10 NOTE — ANESTHESIA PROCEDURE NOTES
Airway  Urgency: elective    Date/Time: 4/27/2023 10:04 AM  Airway not difficult    General Information and Staff    Patient location during procedure: OR  Anesthesiologist: Radha Maxwell DO  CRNA/CAA: Mitchell Ford CRNA    Indications and Patient Condition  Indications for airway management: airway protection    Preoxygenated: yes  MILS not maintained throughout  Mask difficulty assessment: 1 - vent by mask    Final Airway Details  Final airway type: endotracheal airway      Successful airway: ETT  Cuffed: yes   Successful intubation technique: direct laryngoscopy and RSI  Facilitating devices/methods: intubating stylet  Endotracheal tube insertion site: oral  Blade: Kat  Blade size: 3  ETT size (mm): 9.0  Cormack-Lehane Classification: grade I - full view of glottis  Placement verified by: chest auscultation and capnometry   Measured from: lips  ETT/EBT  to lips (cm): 20  Number of attempts at approach: 1  Assessment: lips, teeth, and gum same as pre-op and atraumatic intubation    Additional Comments  Negative epigastric sounds, Breath sound equal bilaterally with symmetric chest rise and fall         no

## (undated) DEVICE — SOL IRR NACL 0.9PCT BT 1000ML

## (undated) DEVICE — CANNULA,OXY,ADULT,SUPERSOFT,W/7'TUB,UC: Brand: MEDLINE

## (undated) DEVICE — Device: Brand: BALLOON

## (undated) DEVICE — Device: Brand: SINGLE USE ASPIRATION NEEDLE NA-U401SX

## (undated) DEVICE — BOWL UTIL STRL 32OZ

## (undated) DEVICE — LUBE GEL ENDOGLIDE 1.1OZ

## (undated) DEVICE — SYRINGE, LUER LOCK, 5ML: Brand: MEDLINE

## (undated) DEVICE — CONNECTOR,STRAIGHT,F/02 SUPPLY TUBING: Brand: MEDLINE

## (undated) DEVICE — ST LINER SAFECAP GRN RED CP STRL

## (undated) DEVICE — LUER-LOK 360°: Brand: CONNECTA, LUER-LOK

## (undated) DEVICE — ST EXT MICROBORE FIX M LL 38IN

## (undated) DEVICE — TRAP,MUCUS SPECIMEN,40CC: Brand: MEDLINE

## (undated) DEVICE — SINGLE USE SUCTION VALVE MAJ-209: Brand: SINGLE USE SUCTION VALVE (STERILE)

## (undated) DEVICE — SYR SLPTP 20CC

## (undated) DEVICE — SINGLE USE BIOPSY VALVE MAJ-210: Brand: SINGLE USE BIOPSY VALVE (STERILE)

## (undated) DEVICE — SYR LUER SLPTP 50ML

## (undated) DEVICE — ADAPT SWVL FIBROPTIC BRONCH